# Patient Record
(demographics unavailable — no encounter records)

---

## 2025-02-12 NOTE — HISTORY OF PRESENT ILLNESS
[FreeTextEntry1] : 42 M CP  - HTN, - DM, - lipids, - tobacco, + family hx premature CAD (grandfather w MI age 40s) Patient reports experiencing chest pain for about a year. The pain is located in the left chest under the breast area. It is random in occurrence, can happen once a day or multiple times a day. The pain lasts for a couple of minutes. It is not related to activity or exertion. Nothing makes it better or worse. Denies, SOB, orthopnea, PND, palpitations or edema  SDicne last visit, CP has completely resolved Main sx is intermittent  non exertional, short lived awareness of rapid HB No syncope or light headedness  Stopped caffeine, reduced EToH, no significant change   JASON screen +snoring non restorative sleep daytime somnolence witnessed apnea

## 2025-02-12 NOTE — ASSESSMENT
[FreeTextEntry1] : EKG NSR nl  echo CONCLUSIONS:  1. Left ventricular wall thickness is normal. Left ventricular systolic function is normal with an ejection fraction of 64 % by Jones's method of disks. There are no regional wall motion abnormalities seen. 2. Normal right ventricular cavity size, with normal wall thickness, and normal right ventricular systolic function. 3. No significant valvular disease. 4. Tricuspid aortic valve with normal leaflet excursion. 5. Pulmonary artery systolic pressure could not be estimated. 6. No pericardial effusion seen.   CT calcium IMPRESSION:  Cardiac: 1.  The calcium score is 0 Agatston units. 2.   Absence of aortic calcification.  HST MILD JASON - AHI 7    A/P 1, chest pain atypical has resolved  echo n; ? JASON related  2. hyperlipidemia 3. fam hx premature CAD    CT calcium score as above  apoB, LP(a) unremarkable for now, encouraged diet lifestyle modification to lower LDL   4. suspect JASON HST mild JASON.  5. palpitations' zio